# Patient Record
Sex: MALE | Race: WHITE | Employment: OTHER | URBAN - METROPOLITAN AREA
[De-identification: names, ages, dates, MRNs, and addresses within clinical notes are randomized per-mention and may not be internally consistent; named-entity substitution may affect disease eponyms.]

---

## 2017-01-06 ENCOUNTER — OFFICE VISIT (OUTPATIENT)
Dept: SLEEP MEDICINE | Facility: CLINIC | Age: 42
End: 2017-01-06
Payer: COMMERCIAL

## 2017-01-06 ENCOUNTER — OFFICE VISIT (OUTPATIENT)
Dept: SURGERY | Facility: CLINIC | Age: 42
End: 2017-01-06
Payer: COMMERCIAL

## 2017-01-06 VITALS
SYSTOLIC BLOOD PRESSURE: 122 MMHG | DIASTOLIC BLOOD PRESSURE: 81 MMHG | HEART RATE: 113 BPM | BODY MASS INDEX: 38.16 KG/M2 | WEIGHT: 229.3 LBS | RESPIRATION RATE: 16 BRPM

## 2017-01-06 VITALS
OXYGEN SATURATION: 95 % | TEMPERATURE: 98.2 F | BODY MASS INDEX: 36.77 KG/M2 | HEART RATE: 104 BPM | SYSTOLIC BLOOD PRESSURE: 118 MMHG | WEIGHT: 228.8 LBS | HEIGHT: 66 IN | DIASTOLIC BLOOD PRESSURE: 70 MMHG

## 2017-01-06 DIAGNOSIS — Z98.84 BARIATRIC SURGERY STATUS: Primary | ICD-10-CM

## 2017-01-06 DIAGNOSIS — E66.9 OBESITY (BMI 30-39.9): ICD-10-CM

## 2017-01-06 DIAGNOSIS — R12 HEARTBURN: ICD-10-CM

## 2017-01-06 DIAGNOSIS — K91.2 MALNUTRITION FOLLOWING GASTROINTESTINAL SURGERY: ICD-10-CM

## 2017-01-06 DIAGNOSIS — G47.33 OSA (OBSTRUCTIVE SLEEP APNEA): Primary | ICD-10-CM

## 2017-01-06 DIAGNOSIS — E66.01 OBESITY, CLASS III, BMI 40-49.9 (MORBID OBESITY) (H): ICD-10-CM

## 2017-01-06 PROCEDURE — 99213 OFFICE O/P EST LOW 20 MIN: CPT | Performed by: INTERNAL MEDICINE

## 2017-01-06 PROCEDURE — 99213 OFFICE O/P EST LOW 20 MIN: CPT | Performed by: PHYSICIAN ASSISTANT

## 2017-01-06 RX ORDER — DOXYCYCLINE 100 MG/1
TABLET ORAL
Refills: 0 | COMMUNITY
Start: 2016-12-29

## 2017-01-06 NOTE — PROGRESS NOTES
Name: Suhail Noriega MRN# [id number]   Age: 41 year old YOB: 1975     Date of Consultation: January 6, 2017  Primary care provider: Ko Pino           Chief Complaint:    Sleep apnea            History of Present Illness:     Suhail Noriega is a 41 year old male with previous diagnosis of severe obstructive sleep apnea. PSG from 21 March 2016 when patient weighed 342 pounds showed an apnea hypopnea index of 103 per hour. Lowest O2 saturation was 64.7% and there was a total of 103 minutes with saturation below 89%. Patient had adequate titration with a pressure of 19 cm H2O and started on auto PAP therapy with pressure range of 15-20 cm H2O. Patient had a good response to therapy.     He underwent laproscopic gastric sleeve for weight loss on 6/20/2016. He has been successful in losing 120 pounds since then. His weight is currently 228 pounds. He had difficulty tolerating high CPAP pressures after weight loss and gastric sleeve procedure. Although pressure settings were reduced to 10-15 cm H2O, he still has difficulty tolerating CPAP. As a result, he has been poorly compliant with CPAP during the last 3 months.     His snoring had decreased after weight loss. His wife still witnesses intermittent gasping episodes but they are infrequent. He reports feeling better in the daytime and does not have the same level of tiredness or sleepiness.            Medications:     Current Outpatient Prescriptions   Medication Sig     doxycycline Monohydrate 100 MG TABS      ranitidine (ZANTAC) 150 MG tablet Take 1 tablet (150 mg) by mouth 2 times daily As needed     Cyanocobalamin (B-12) 3000 MCG SUBL Place 1 tablet under the tongue 3 times weekly     VITAMIN D, CHOLECALCIFEROL, PO Take 2,000 Units by mouth daily     calcium carbonate-vitamin D 600-400 MG-UNIT CHEW Take 2 chew tab by mouth daily      multivitamin, therapeutic with minerals (THERA-VIT-M) TABS Take 2 tablets by mouth every morning  "Flintstones Complete     order for DME Patient Suhail Noriega was set up at [facility] on March 31, 2016. Patient received a [employer] DreamStation Auto. Pressures were set at Auto 10-15 cm H2O.   Patient s ramp is 7 cm H2O for 30 min and FLEX/EPR is 2.  Patient received a [employer] Mask name: simplus  Full Face mask Size Medium, heated tubing and heated humidifier.  Patient is enrolled in the STM Program and does need to meet compliance. Patient has a follow up on 5/19/16 with Dr. Dahiana Moseley     No current facility-administered medications for this visit.        Allergies   Allergen Reactions     Morphine Itching            Past Medical History:     Does not need 02 supplement at night   Past Medical History   Diagnosis Date     Morbid obesity (H)      Sleep apnea      uses C pap     Psoriasis              Past Surgical History:    No h/o  upper airway surgery  Past Surgical History   Procedure Laterality Date     Laparoscopic gastric sleeve N/A 6/20/2016     Procedure: LAPAROSCOPIC GASTRIC SLEEVE;  Surgeon: John Virgen MD;  Location:  OR            Physical Examination:   /70 mmHg  Pulse 104  Temp(Src) 98.2  F (36.8  C) (Oral)  Ht 1.676 m (5' 6\")  Wt 103.783 kg (228 lb 12.8 oz)  BMI 36.95 kg/m2  SpO2 95%            Assessment and Plan:        Obstructive sleep apnea           Sleep related hypoxemia    - Patient who is s/p bariatric surgery has lost about 30% of his weight since diagnosis of severe obstructive sleep apnea. Sleep apnea symptoms are reduced. He has difficulty tolerating currently prescribed CPAP pressures. I have recommended that he continue CPAP therapy until we reassess sleep disordered breathing with a PSG. Will reduce pressures to improve tolerance. Pressure settings will be changed to 5-15 cm H2O.     - Split night PSG to reassess sleep apnea and CPAP titration as indicated. Patient was advised to abstain from CPAP therapy for 3 days prior to " repeat study for an adequate evaluation.     - Follow up after sleep study      Copy to: Ko Pino MD, MD 1/6/2017     I spent a total of 15 minutes with patient with more than 50% counseling regard

## 2017-01-06 NOTE — MR AVS SNAPSHOT
After Visit Summary   1/6/2017    Suhail Noriega    MRN: [id number]           Patient Information     Date Of Birth          1975        Visit Information        Provider Department      1/6/2017 2:30 PM Rigoberto Thomas MD [facility] [place]        Today's Diagnoses     CHANDLER (obstructive sleep apnea)    -  1       Care Instructions      Your BMI is Body mass index is 36.95 kg/(m^2).  Weight management is a personal decision.  If you are interested in exploring weight loss strategies, the following discussion covers the approaches that may be successful. Body mass index (BMI) is one way to tell whether you are at a healthy weight, overweight, or obese. It measures your weight in relation to your height.  A BMI of 18.5 to 24.9 is in the healthy range. A person with a BMI of 25 to 29.9 is considered overweight, and someone with a BMI of 30 or greater is considered obese. More than two-thirds of American adults are considered overweight or obese.  Being overweight or obese increases the risk for further weight gain. Excess weight may lead to heart disease and diabetes.  Creating and following plans for healthy eating and physical activity may help you improve your health.  Weight control is part of healthy lifestyle and includes exercise, emotional health, and healthy eating habits. Careful eating habits lifelong are the mainstay of weight control. Though there are significant health benefits from weight loss, long-term weight loss with diet alone may be very difficult to achieve- studies show long-term success with dietary management in less than 10% of people. Attaining a healthy weight may be especially difficult to achieve in those with severe obesity. In some cases, medications, devices and surgical management might be considered.  What can you do?  If you are overweight or obese and are interested in methods for weight loss, you should discuss this with your provider.     Consider  reducing daily calorie intake by 500 calories.     Keep a food journal.     Avoiding skipping meals, consider cutting portions instead.    Diet combined with exercise helps maintain muscle while optimizing fat loss. Strength training is particularly important for building and maintaining muscle mass. Exercise helps reduce stress, increase energy, and improves fitness. Increasing exercise without diet control, however, may not burn enough calories to loose weight.       Start walking three days a week 10-20 minutes at a time    Work towards walking thirty minutes five days a week     Eventually, increase the speed of your walking for 1-2 minutes at time    In addition, we recommend that you review healthy lifestyles and methods for weight loss available through the National Institutes of Health patient information sites:  http://win.niddk.nih.gov/publications/index.htm    And look into health and wellness programs that may be available through your health insurance provider, employer, local community center, or anil club.    Weight management plan: Patient was referred to their PCP to discuss a diet and exercise plan.            Follow-ups after your visit        Your next 10 appointments already scheduled     Mar 12, 2017  8:30 PM   PSG Split with BED 4  SLEEP   [facility] ([facility])    [address]  [facility] [place] [address]   [phone]            Mar 31, 2017 12:30 PM   Return Sleep Patient with Rigoberto Thomas MD   [facility] ([facility])    [address]  [place] [address]   [phone]            Jun 30, 2017 10:00 AM   Annual Visit with Beckie Manriquez PA-C   [facility] ([facility])    [address]  [place] [address]   [phone]            Jun 30, 2017 10:30 AM   Annual Visit with Clara Arnold Diet 2, RD    [facility] [place] ([facility])    [facility] [id number] [phone]              Future tests that were ordered for you today     Open Future Orders        Priority Expected Expires Ordered    Comprehensive Sleep Study Routine  7/5/2017 1/6/2017    Vitamin B12 Routine 7/6/2017 9/6/2017 1/6/2017    IRON Routine 7/6/2017 9/6/2017 1/6/2017    IRON AND IRON BINDING CAPACITY Routine 7/6/2017 9/6/2017 1/6/2017    FERRITIN Routine 7/6/2017 9/6/2017 1/6/2017    Vitamin D Screen Routine 7/6/2017 9/6/2017 1/6/2017    Parathyroid Hormone Intact Routine 7/6/2017 9/6/2017 1/6/2017    CBC with platelets Routine 7/6/2017 9/6/2017 1/6/2017            Who to contact     If you have questions or need follow up information about today's clinic visit or your schedule please contact [facility] directly at [phone].  Normal or non-critical lab and imaging results will be communicated to you by TripHobohart, letter or phone within 4 business days after the clinic has received the results. If you do not hear from us within 7 days, please contact the clinic through SweetPerkt or phone. If you have a critical or abnormal lab result, we will notify you by phone as soon as possible.  Submit refill requests through [email or web address] or call your pharmacy and they will forward the refill request to us. Please allow 3 business days for your refill to be completed.          Additional Information About Your Visit        TripHobohariWOPI Information     [email or web address] gives you secure access to your electronic health record. If you see a primary care provider, you can also send messages to your care team and make appointments. If you have questions, please call your primary care clinic.  If you do not have a primary care provider, please call [phone] and they will assist you.        Care EveryWhere ID     This is your Care EveryWhere ID. This could be used by  "other organizations to access your [place] medical records  [id number]        Your Vitals Were     Pulse Temperature Height BMI (Body Mass Index) Pulse Oximetry       104 98.2  F (36.8  C) (Oral) 1.676 m (5' 6\") 36.95 kg/m2 95%        Blood Pressure from Last 3 Encounters:   01/06/17 118/70   01/06/17 122/81   09/23/16 122/75    Weight from Last 3 Encounters:   01/06/17 103.783 kg (228 lb 12.8 oz)   01/06/17 104.01 kg (229 lb 4.8 oz)   09/23/16 120.702 kg (266 lb 1.6 oz)              We Performed the Following     Comprehensive DME          Today's Medication Changes          These changes are accurate as of: 1/6/17  2:55 PM.  If you have any questions, ask your nurse or doctor.               These medicines have changed or have updated prescriptions.        Dose/Directions    ranitidine 150 MG tablet   Commonly known as:  ZANTAC   This may have changed:    - additional instructions  - Another medication with the same name was removed. Continue taking this medication, and follow the directions you see here.   Used for:  Bariatric surgery status, Obesity, Class III, BMI 40-49.9 (morbid obesity) (H), Heartburn   Changed by:  Aydee Fitzgerald PA-C        Dose:  150 mg   Take 1 tablet (150 mg) by mouth 2 times daily As needed   Quantity:  180 tablet   Refills:  1            Where to get your medicines      These medications were sent to [id number] - [place], [place] - [address] AT [facility] [place] [address]  [address], [place] [address]    Hours:  24-hours Phone:  [phone]    - ranitidine 150 MG tablet             Primary Care Provider Office Phone # Fax #    Ko Pino [phone]       [place] [facility] [address]  [place] [address]        Thank you!     Thank you for choosing [facility]  for your care. Our goal is always to provide you with excellent care. Hearing back from our patients is one way we can " continue to improve our services. Please take a few minutes to complete the written survey that you may receive in the mail after your visit with us. Thank you!             Your Updated Medication List - Protect others around you: Learn how to safely use, store and throw away your medicines at www.disposemymeds.org.          This list is accurate as of: 1/6/17  2:55 PM.  Always use your most recent med list.                   Brand Name Dispense Instructions for use    B-12 3000 MCG Subl      Place 1 tablet under the tongue 3 times weekly       calcium carbonate-vitamin D 600-400 MG-UNIT Chew      Take 2 chew tab by mouth daily       doxycycline Monohydrate 100 MG Tabs          multivitamin, therapeutic with minerals Tabs tablet      Take 2 tablets by mouth every morning Flintstones Complete       order for DME      Patient Suhail Noriega was set up at [facility] on March 31, 2016. Patient received a [employer] DreamStation Auto. Pressures were set at Auto 10-15 cm H2O.   Patient?s ramp is 7 cm H2O for 30 min and FLEX/EPR is 2.  Patient received a [employer] Mask name: simplus  Full Face mask Size Medium, heated tubing and heated humidifier.  Patient is enrolled in the STM Program and does need to meet compliance. Patient has a follow up on 5/19/16 with Dr. Thomas.  Estefanía Moseley       ranitidine 150 MG tablet    ZANTAC    180 tablet    Take 1 tablet (150 mg) by mouth 2 times daily As needed       VITAMIN D (CHOLECALCIFEROL) PO      Take 2,000 Units by mouth daily

## 2017-01-06 NOTE — PATIENT INSTRUCTIONS

## 2017-01-06 NOTE — NURSING NOTE
"Chief Complaint   Patient presents with     Sleep Problem       Initial /70 mmHg  Pulse 104  Temp(Src) 98.2  F (36.8  C) (Oral)  Ht 1.676 m (5' 6\")  Wt 103.783 kg (228 lb 12.8 oz)  BMI 36.95 kg/m2  SpO2 95% Estimated body mass index is 36.95 kg/(m^2) as calculated from the following:    Height as of this encounter: 1.676 m (5' 6\").    Weight as of this encounter: 103.783 kg (228 lb 12.8 oz).  BP completed using cuff size: regular left arm  Avis Albright MA  [facility]      "

## 2017-01-06 NOTE — PROGRESS NOTES
Bariatric Follow Up Visit    Date: 2017    RE: MANE WYNN  MR#: [id number]  : 1975    HISTORY OF PRESENT ILLNESS: MANE WYNN returns today for his follow-up appointment status post Laparoscopic Gastric Sleeve surgery. He has lost 111 lbs since starting our program. Would like to get to 180 lbs.  Patient is feeling well. He is doing the following exercise(s): working out at the gym . He exercises every day for 90 minutes per session.  He is unable to see the dietician today but will schedule his annual with them.     Patient is taking the following bariatric postoperative vitamins:  4000 Int Units of Vitamin D daily - increased in December under my direction    He stopped taking all other recommended post op vitamins since his last visit because he wanted to see how his labs responded.  States he eats fish several times a week and drinks Fairlife milk.  Feels he is getting his vitamins from his food sources. Is resistant to restarting recommended vitamins.      OBESITY RELATED CONDITIONS:  CHANDLER: improved, stopped using CPAP 4 months ago.  Has follow up with sleep clinic right after this appointment.  Back Pain: improved    SOCIAL HISTORY:  He does not smoke. He does not drink alcohol. He does not attend support group and feels safe in current environment.  Pt is avoiding NSAIDS.  Does have tendinitis in his shoulder.  Did ask if he could take cinnamon for its natural antiinflammatory properties.   Also, if he can get a Cortizone injection.    REVIEW OF SYSTEMS:    GI:  Nausea-never  Vomiting-never  Diarrhea-never  Constipation-seldom  Dysphagia-never  Abdominal Pains-never  Heartburn-seldom    Skin:  Intertriginous Irritation-never    Psychiatric:  Depression never    PHYSICAL EXAMINATION:   Vital Signs: Weight: 229  lbs; BMI: 37.5; BP: 122 / 75 ; Pulse: 92; Resp: 16;  GENERAL: Alert and oriented x3. NAD  HEART: Regular rate and rhythm, No murmurs, rubs or gallops  LUNGS: Breathing unlabored,  Lung sounds clear to auscultation bilaterally  ABDOMEN: soft; nontender; non distended, incision well healed. No hernia  EXTREMITIES: No LE edema bilaterally, Gait normal  SKIN: No intertriginous irritation or rash    ASSESSMENT:    6 months s/p Laparoscopic Gastric Sleeve  Post surgical malabsorption  GERD without esophagitis      PLAN:    Reviewed Ordered CBC, vitamin B12, vitamin D, PTH, ferritin, TIBC, and iron labs.  Refilled ranitidine.    Can take cinnamon.    Ordered CBC, vitamin B12, vitamin D, PTH, ferritin, TIBC, and iron labs to de drawn at [facility] in 6 months before annual appointment.   Recommend he restart his post operative vitamins.    If he is unwilling to do the calcium and B12 than I would still like him to restart taking his multivitamins.  Flinstones Complete is also a multi mineral. I do not check thiamine, zinc, and copper on a regular basis as bariatric patients should be getting this in their MVI.  If not willing to do this, we discussed doing more vitamin and mineral labs on regular intervals.  Pt will restart Flinstones Complete.  Will review again at annual appt.   Return to clinic in 6 months.

## 2017-01-11 ENCOUNTER — DOCUMENTATION ONLY (OUTPATIENT)
Dept: SLEEP MEDICINE | Facility: CLINIC | Age: 42
End: 2017-01-11

## 2017-04-27 ENCOUNTER — TELEPHONE (OUTPATIENT)
Dept: SURGERY | Facility: CLINIC | Age: 42
End: 2017-04-27

## 2017-04-27 DIAGNOSIS — K91.2 MALNUTRITION FOLLOWING GASTROINTESTINAL SURGERY: ICD-10-CM

## 2017-04-27 DIAGNOSIS — Z98.84 BARIATRIC SURGERY STATUS: ICD-10-CM

## 2017-04-27 LAB
ERYTHROCYTE [DISTWIDTH] IN BLOOD BY AUTOMATED COUNT: 13.2 % (ref 10–15)
HCT VFR BLD AUTO: 45.7 % (ref 40–53)
HGB BLD-MCNC: 15.5 G/DL (ref 13.3–17.7)
MCH RBC QN AUTO: 34 PG (ref 26.5–33)
MCHC RBC AUTO-ENTMCNC: 33.9 G/DL (ref 31.5–36.5)
MCV RBC AUTO: 100 FL (ref 78–100)
PLATELET # BLD AUTO: 220 10E9/L (ref 150–450)
RBC # BLD AUTO: 4.56 10E12/L (ref 4.4–5.9)
WBC # BLD AUTO: 9.2 10E9/L (ref 4–11)

## 2017-04-27 PROCEDURE — 82607 VITAMIN B-12: CPT | Performed by: PHYSICIAN ASSISTANT

## 2017-04-27 PROCEDURE — 83540 ASSAY OF IRON: CPT | Performed by: PHYSICIAN ASSISTANT

## 2017-04-27 PROCEDURE — 82728 ASSAY OF FERRITIN: CPT | Performed by: PHYSICIAN ASSISTANT

## 2017-04-27 PROCEDURE — 83550 IRON BINDING TEST: CPT | Performed by: PHYSICIAN ASSISTANT

## 2017-04-27 PROCEDURE — 85027 COMPLETE CBC AUTOMATED: CPT | Performed by: PHYSICIAN ASSISTANT

## 2017-04-27 PROCEDURE — 36415 COLL VENOUS BLD VENIPUNCTURE: CPT | Performed by: PHYSICIAN ASSISTANT

## 2017-04-27 PROCEDURE — 83970 ASSAY OF PARATHORMONE: CPT | Performed by: PHYSICIAN ASSISTANT

## 2017-04-27 PROCEDURE — 82306 VITAMIN D 25 HYDROXY: CPT | Performed by: PHYSICIAN ASSISTANT

## 2017-04-27 NOTE — TELEPHONE ENCOUNTER
Patient called to request yearly lab orders for his sleeve be entered in the [facility] system.  Explained to patient that this isn't possible.  Offered to fax lab orders for his PCP to do.  Patient will call his clinic to come up with a plan and call back with their fax number if needed.  Patient verbalized understanding and is agreeable to plan.    Charity Evans, MS, RD, RN

## 2017-04-28 LAB
DEPRECATED CALCIDIOL+CALCIFEROL SERPL-MC: 30 UG/L (ref 20–75)
FERRITIN SERPL-MCNC: 303 NG/ML (ref 26–388)
IRON SATN MFR SERPL: 40 % (ref 15–46)
IRON SERPL-MCNC: 127 UG/DL (ref 35–180)
PTH-INTACT SERPL-MCNC: 50 PG/ML (ref 12–72)
TIBC SERPL-MCNC: 319 UG/DL (ref 240–430)
VIT B12 SERPL-MCNC: 984 PG/ML (ref 193–986)

## 2017-06-30 ENCOUNTER — OFFICE VISIT (OUTPATIENT)
Dept: SURGERY | Facility: CLINIC | Age: 42
End: 2017-06-30
Payer: COMMERCIAL

## 2017-06-30 VITALS
BODY MASS INDEX: 33.52 KG/M2 | SYSTOLIC BLOOD PRESSURE: 137 MMHG | DIASTOLIC BLOOD PRESSURE: 74 MMHG | HEART RATE: 85 BPM | WEIGHT: 207.7 LBS

## 2017-06-30 DIAGNOSIS — R12 HEARTBURN: ICD-10-CM

## 2017-06-30 DIAGNOSIS — E66.811 OBESITY (BMI 30.0-34.9): Primary | ICD-10-CM

## 2017-06-30 DIAGNOSIS — Z98.84 BARIATRIC SURGERY STATUS: ICD-10-CM

## 2017-06-30 DIAGNOSIS — K91.2 POSTSURGICAL MALABSORPTION: ICD-10-CM

## 2017-06-30 PROCEDURE — 99214 OFFICE O/P EST MOD 30 MIN: CPT | Performed by: PHYSICIAN ASSISTANT

## 2017-06-30 NOTE — MR AVS SNAPSHOT
MRN:[id number]                      After Visit Summary   6/30/2017    Suhail Noriega    MRN: [id number]           Visit Information        Provider Department      6/30/2017 10:30 AM 2, Sh Clayton De Los Santos, ASHLEIGH [facility]      [facility] Information     ConSentry Networkshart gives you secure access to your electronic health record. If you see a primary care provider, you can also send messages to your care team and make appointments. If you have questions, please call your primary care clinic.  If you do not have a primary care provider, please call [phone] and they will assist you.        Care EveryWhere ID     This is your Care EveryWhere ID. This could be used by other organizations to access your [facility] medical records  [id number]        Equal Access to Services     JANELLE LOUIS : Karena Garber, washeree cooper, harman kaalmanickie oliva, aga erazo. So [facility] [phone].    ATENCIÓN: Si habla español, tiene a kent disposición servicios gratuitos de asistencia lingüística. Llame al [phone].    We comply with applicable federal civil rights laws and [place] laws. We do not discriminate on the basis of race, color, national origin, age, disability sex, sexual orientation or gender identity.

## 2017-06-30 NOTE — MR AVS SNAPSHOT
After Visit Summary   6/30/2017    Suhail Noriega    MRN: [id number]           Patient Information     Date Of Birth          1975        Visit Information        Provider Department      6/30/2017 10:00 AM Beckie Manriquez PA-C [facility]      Today's Diagnoses     Obesity (BMI 30.0-34.9)    -  1    Bariatric surgery status        Postsurgical malabsorption        Heartburn          Care Instructions    PLAN:     Ordered CBC, vitamin B12, vitamin D, PTH, ferritin, TIBC, and iron labs.  Follow food plan per dietitian recommendations.  Start taking recommended post-op vitamins. - Strongly encouraged patient to at least take the recommended MVI with minerals and iron.  Informed him he could be deficient in things such as copper that are not routinely checked.  Patient states he might start taking one of these daily.  heartburn  - continue with daily zantac.  Rx sent over to pharmacy  Return to clinic in 1 year.          Follow-ups after your visit        Follow-up notes from your care team     Return in 1 year (on 6/30/2018).      Future tests that were ordered for you today     Open Future Orders        Priority Expected Expires Ordered    Vitamin D Screen Routine 6/30/2017 6/30/2018 6/30/2017    Parathyroid Hormone Intact Routine 6/30/2017 6/30/2018 6/30/2017    Iron Routine 6/30/2017 6/30/2018 6/30/2017    Iron and Iron Binding Capacity Routine 6/30/2017 6/30/2018 6/30/2017    Ferritin Routine 6/30/2017 6/30/2018 6/30/2017    CBC with platelets Routine 6/30/2017 6/30/2018 6/30/2017    Vitamin B12 Routine 6/30/2017 6/30/2018 6/30/2017            Who to contact     If you have questions or need follow up information about today's clinic visit or your schedule please contact [facility] directly at [phone].  Normal or non-critical lab and imaging results will be communicated to  you by MyChart, letter or phone within 4 business days after the clinic has received the results. If you do not hear from us within 7 days, please contact the clinic through [employer] or phone. If you have a critical or abnormal lab result, we will notify you by phone as soon as possible.  Submit refill requests through [employer] or call your pharmacy and they will forward the refill request to us. Please allow 3 business days for your refill to be completed.          Additional Information About Your Visit        [employer] Information     [employer] gives you secure access to your electronic health record. If you see a primary care provider, you can also send messages to your care team and make appointments. If you have questions, please call your primary care clinic.  If you do not have a primary care provider, please call [phone] and they will assist you.        Care EveryWhere ID     This is your Care EveryWhere ID. This could be used by other organizations to access your [facility] medical records  [id number]        Your Vitals Were     Pulse BMI (Body Mass Index)                85 33.52 kg/m2           Blood Pressure from Last 3 Encounters:   06/30/17 137/74   01/06/17 118/70   01/06/17 122/81    Weight from Last 3 Encounters:   06/30/17 207 lb 11.2 oz (94.2 kg)   01/06/17 228 lb 12.8 oz (103.8 kg)   01/06/17 229 lb 4.8 oz (104 kg)              We Performed the Following     OP ROOMING NOTE TO LAURITA          Today's Medication Changes          These changes are accurate as of: 6/30/17  3:16 PM.  If you have any questions, ask your nurse or doctor.               These medicines have changed or have updated prescriptions.        Dose/Directions    * ranitidine 150 MG tablet   Commonly known as:  ZANTAC   This may have changed:  Another medication with the same name was added. Make sure you understand how and when to take each.   Used for:  Bariatric surgery status, Obesity, Class III, BMI 40-49.9 (morbid obesity)  (H), Heartburn   Changed by:  Aydee Fitzgerald PA-C        Dose:  150 mg   Take 1 tablet (150 mg) by mouth 2 times daily As needed   Quantity:  180 tablet   Refills:  1       * ranitidine 150 MG tablet   Commonly known as:  ZANTAC   This may have changed:  You were already taking a medication with the same name, and this prescription was added. Make sure you understand how and when to take each.   Used for:  Bariatric surgery status, Heartburn   Changed by:  Beckie Manriquez PA-C        Dose:  150 mg   Take 1 tablet (150 mg) by mouth At Bedtime   Quantity:  180 tablet   Refills:  1       * Notice:  This list has 2 medication(s) that are the same as other medications prescribed for you. Read the directions carefully, and ask your doctor or other care provider to review them with you.         Where to get your medicines      These medications were sent to [id number] - [place], [place] - [address]  [address], [facility] [address]     Phone:  [phone]     ranitidine 150 MG tablet                Primary Care Provider Office Phone # Fax #    Ko Pino [phone]       [facility] [address] [facility] [address]        Equal Access to Services     JANELLE LOUIS AH: Hadii tommy [place] hadasho Soomaali, waaxda luqadaha, qaybta kaalmada adeegyada, waxay jose rafael hayarias erazo. So [facility] [phone].    ATENCIÓN: Si habla español, tiene a kent disposición servicios gratrachos de asistencia lingüística. Llame al [phone].    We comply with applicable federal civil rights laws and [place] laws. We do not discriminate on the basis of race, color, national origin, age, disability sex, sexual orientation or gender identity.            Thank you!     Thank you for choosing [facility]  for your care. Our goal is always to provide you with excellent care. Hearing back from our patients is one way we  can continue to improve our services. Please take a few minutes to complete the written survey that you may receive in the mail after your visit with us. Thank you!             Your Updated Medication List - Protect others around you: Learn how to safely use, store and throw away your medicines at www.disposemymeds.org.          This list is accurate as of: 6/30/17  3:16 PM.  Always use your most recent med list.                   Brand Name Dispense Instructions for use Diagnosis    B-12 3000 MCG Subl      Place 1 tablet under the tongue 3 times weekly        calcium carbonate-vitamin D 600-400 MG-UNIT Chew      Take 2 chew tab by mouth daily    Morbid obesity with BMI of 50.0-59.9, adult (H)       doxycycline Monohydrate 100 MG Tabs           multivitamin, therapeutic with minerals Tabs tablet      Take 2 tablets by mouth every morning Flintstones Complete    Morbid obesity with BMI of 50.0-59.9, adult (H)       order for DME      Patient Suhail Noriega was set up at [facility] on March 31, 2016. Patient received a [employer] DreamStation Auto. Pressures were set at Auto 5-15 cm H2O.   Patient?s ramp is 7 cm H2O for 30 min and FLEX/EPR is 2.  Patient received a [employer] Mask name: simplus  Full Face mask Size Medium, heated tubing and heated humidifier.  Patient is enrolled in the [facility] Program and does need to meet compliance. Patient has a follow up on 5/19/16 with Dr. Dahiana Moseley        * ranitidine 150 MG tablet    ZANTAC    180 tablet    Take 1 tablet (150 mg) by mouth 2 times daily As needed    Bariatric surgery status, Obesity, Class III, BMI 40-49.9 (morbid obesity) (H), Heartburn       * ranitidine 150 MG tablet    ZANTAC    180 tablet    Take 1 tablet (150 mg) by mouth At Bedtime    Bariatric surgery status, Heartburn       VITAMIN D (CHOLECALCIFEROL) PO      Take 2,000 Units by mouth daily    Morbid obesity with BMI of 50.0-59.9, adult (H)       * Notice:  This list has 2  medication(s) that are the same as other medications prescribed for you. Read the directions carefully, and ask your doctor or other care provider to review them with you.

## 2017-06-30 NOTE — PROGRESS NOTES
DATE OF VISIT: 2017  Name: MANE WYNN  : 1975  Gender: Male  MRN: [id number]  Age: 41    ASSESSMENT:  REASON FOR VISIT:  MANE WYNN is a 41 year old Male presents today for 1 year PO nutrition follow-up appointment.  DIAGNOSIS:  Status post Laparoscopic Gastric Sleeve surgery.  Obesity Class I  ANTHROPOMETRICS:  Height: 65.5 inches  Weight: 207 lbs  BMI: 33.9 kg/m2  VITAMINS AND MINERALS:  2000 IU vitamin D   NUTRITION HISTORY:  Breakfast: 1-2 protein shake (Muscle Milk light) or 42 gram protein shake   Lunch: sushi (mackerel) 8-10 pc 6-8 oz.  Supper: chicken or bratwurst (2) or pork chops (6 oz.) 4 oz fish or chicken or steak Patient eats fruit 1 time per week and vegetable 1 time per day   Snacks: Protein drink or 16 oz skim Fairlife milk (often 2 per day)  Beverages:water, protein drink, Fairlife milk  Consuming liquid calories: Protein supplements/shakes, Milk  Protein intake:  grams/day  Tolerate regular texture food: Yes  Any foods not tolerated details: tomatoes or acidic foods   Portion size: 1 cup or more  Take 30 minutes to consume each meal: Yes  Eat protein foods first: Yes  Fluids and meals separate by at least 30 minutes: Yes  Chew foods 20 plus times: Yes  Tolerating diet: bariatric regular diet  Drinking high protein supplements/shakes: Yes  Consuming meals per day: 3  Consuming snacks per day: 1  Comment: Pt attributes weight loss this month to tracking his intake on a spread sheet and increased exercise. This past month pt has been looking at bariatric blogs and has decided that he would like to have a sleeve gastrostomy. Pt and his wife work from home and enjoy dinning out often; patient's brother had weight loss surgery and has been successful; father of 3 teenagers; works in the [employer] industry with people form other contries (works evening hours) and generally wakes up at 11:00 am-12:00 pm; pt had many appropriate questions rding weight loss surgery. Pt is pleased with  his weight loss and denies any problems with eating  6/30/17 Patient very pleased with weight loss and feels he is consuming adequate diet.    PHYSICAL ACTIVITY:  Type: working out at the gym. Lifting and cardio  Frequency: Every day  Duration (min): 90  DIAGNOSIS:  Previous Nutrition Diagnosis: Altered gastrointestinal function related to alteration in gastrointestinal structure as evidenced by history of Laparoscopic Gastric Sleeve surgery.-no change  Previous goals:  Continue to practice all post-surgical diet guidelines-met  Take calcium 2 hours away from multivitamin/ minerals-not met  Have 3 different food groups per meal-not met  Limit protein drinks to one per day-not met   Current Nutrition Diagnosis: Altered gastrointestinal function related to alteration in gastrointestinal structure as evidenced by history of Laparoscopic Gastric Sleeve  INTERVENTION:  Nutrition Prescription: Eat 3 meals a day at regular intervals. Consume 60-90 grams of protein daily. Follow post-surgical vitamins and minerals protocol.  Goals:  Resume taking complete multivitamin and mineral   Implementation: Discussed progress toward previous goals; reinforced importance of following bariatric lifestyle changes. Patient was not receptive to adding additional foods into his diet as patient prefers Atkins type diet and feels he can meet his nutritional needs. Explained that vitamin and mineral deficiencies develop slowly over time. Stressed importance of copper in his diet.   NUTRITION MONITORING AND EVALUATION:  Anticipated compliance: Fair to good  Verbalized understanding by stating will take 1 multivitamin    Follow up:  Patient to follow up in 1 year for 2 year post op appointment   TIME SPENT WITH PATIENT:  15 minutes  Michael Woods, RD, LD  [facility] Outpatient Dietitian  [phone] (office phone)

## 2017-06-30 NOTE — PATIENT INSTRUCTIONS
PLAN:     Ordered CBC, vitamin B12, vitamin D, PTH, ferritin, TIBC, and iron labs.  Follow food plan per dietitian recommendations.  Start taking recommended post-op vitamins. - Strongly encouraged patient to at least take the recommended MVI with minerals and iron.  Informed him he could be deficient in things such as copper that are not routinely checked.  Patient states he might start taking one of these daily.  heartburn  - continue with daily zantac.  Rx sent over to pharmacy  Return to clinic in 1 year.

## 2017-06-30 NOTE — PROGRESS NOTES
Bariatric Follow Up Visit       Date: 2017      RE: MANE WYNN  MR#: [id number]  : 1975      HISTORY OF PRESENT ILLNESS: MANE WYNN returns today for his follow-up appointment status post Laparoscopic Gastric Sleeve surgery. He has lost 133.0 lbs since starting our program. Patient is feeling well. He is doing the following exercise(s): working out at the gym. Lifting and cardio. He exercises everyday for 90 minutes per session. Overall doing very well and feeling good.  Only takes 2000 IU's of Vitamin D daily.  Feels that he gets what he needs from his diet so does not take the other recommended vitamins.  Drinks 128+ oz of fluid daily.        OBESITY RELATED CONDITIONS:  CHANDLER: resolved - Not on CPAP  Back Pain: improved       SOCIAL HISTORY:  He does not smoke. He drinks alcohol and consumed a couple glasses of wine over the past 6 months. NO NSAID use. He does not attend support group and feels safe in current environment.      REVIEW OF SYSTEMS:  GI:  Nausea-never  Vomiting-never  Diarrhea-never  Constipation-seldom  Dysphagia-never  Abdominal Pains-never  Heartburn-seldom - takes zantac daily at bedtime.  Well controlled with this and avoiding acidic foods.      Skin:  Intertriginous Irritation-never    Psychiatric:  Depression never      PHYSICAL EXAMINATION:   Vital Signs: Weight: 207.7  lbs; BMI: 33.52; BP:137 / 74; Pulse: 85; Resp: 16;    GENERAL: Alert and oriented x3. NAD  HEART: Regular rate and rhythm, No murmurs, rubs or gallops  LUNGS: Breathing unlabored, Lung sounds clear to auscultation bilaterally  ABDOMEN: soft; nontender; nondistended, incision well healed. No hernia  EXTREMITIES: No LE edema bilaterally, Gait normal  SKIN: No intertriginous irritation or rash      ASSESSMENT:   12 months s/p Laparoscopic Gastric Sleeve  Post surgical malabsorption  heartburn  Morbid obesity resolved  CHANDLER - resolved      PLAN:   Ordered CBC, vitamin B12, vitamin D, PTH, ferritin, TIBC, and  iron labs.  Follow food plan per dietitian recommendations.  Start taking recommended post-op vitamins. - Strongly encouraged patient to at least take the recommended MVI with minerals and iron.  Informed him he could be deficient in things such as copper that are not routinely checked.  Patient states he might start taking one of these daily.  heartburn  - continue with daily zantac.  Rx sent over to pharmacy  Return to clinic in 1 year.    This was a 20 minute visit with greater than 50% spent on counseling.

## 2017-11-27 DIAGNOSIS — G47.33 OSA (OBSTRUCTIVE SLEEP APNEA): Primary | ICD-10-CM

## 2017-12-04 ENCOUNTER — TELEPHONE (OUTPATIENT)
Dept: SURGERY | Facility: CLINIC | Age: 42
End: 2017-12-04

## 2017-12-04 DIAGNOSIS — Z98.84 BARIATRIC SURGERY STATUS: ICD-10-CM

## 2017-12-04 DIAGNOSIS — R12 HEARTBURN: ICD-10-CM

## 2017-12-04 NOTE — TELEPHONE ENCOUNTER
Pt called her needs refill of ranitidine.  He also was googling ranitidine and his other medications online looking at possible long term side effects of ranitidine and wants to make sure she should still be takign it or if there are alternatives. Pt said he had pneumonia in the last year and never had it before and takes sildenafil and saw online that ranitidine can causes low sexual desire.      I reviewed Up to Date Drug information and adverse SE.  ED and low sexual desire are not common side effects.  pneumonia was seen in a study in pediatric patients in 2006 but it was not a causal relationship.  I still believe it is safe to take.  If yo are not having symptoms on a regular basis you can stop taking ranitidine and use it as needed.  Alternatively, you can use Tums, or Mylanta when heartburn occurs.  You can avoid spicy, tomato based products.  Don't eat within 3 hours of bed.  All these things should help.  If you are getting symptoms consistently more than twice weekly despite these changes, then you should be taking your ranitidine twice daily.      Pt is good with this.  Would like refill today and will try other options for the next week to see if he needs to be on it still.

## 2017-12-05 DIAGNOSIS — E66.01 OBESITY, CLASS III, BMI 40-49.9 (MORBID OBESITY) (H): ICD-10-CM

## 2017-12-05 DIAGNOSIS — R12 HEARTBURN: ICD-10-CM

## 2017-12-05 DIAGNOSIS — Z98.84 BARIATRIC SURGERY STATUS: ICD-10-CM

## 2018-03-12 ENCOUNTER — TELEPHONE (OUTPATIENT)
Dept: SURGERY | Facility: CLINIC | Age: 43
End: 2018-03-12

## 2018-03-12 NOTE — TELEPHONE ENCOUNTER
Pt left message on VM.  Got annual letter in mail. HE is 2 years post op and saw her needs to have labs and dexa scan done.  Wants to talk to someone about having these ordered.    Shantelle, please call the pt back and let him know we will discuss these are order them at his 2 year follow up visit in June.  If he wants to get the labs drawn early he can schedule his appt and I can order them to be drawn a week before his appointment.

## 2018-03-13 NOTE — TELEPHONE ENCOUNTER
Extended vitamin labs for upcoming appt.  Will not order LFTs there is no medical need for this.  Send pt mychart regarding this.

## 2018-04-03 DIAGNOSIS — K91.2 POSTSURGICAL MALABSORPTION: ICD-10-CM

## 2018-04-03 DIAGNOSIS — Z98.84 BARIATRIC SURGERY STATUS: ICD-10-CM

## 2018-04-03 LAB
ERYTHROCYTE [DISTWIDTH] IN BLOOD BY AUTOMATED COUNT: 13 % (ref 10–15)
HCT VFR BLD AUTO: 44.3 % (ref 40–53)
HGB BLD-MCNC: 14.6 G/DL (ref 13.3–17.7)
MCH RBC QN AUTO: 33 PG (ref 26.5–33)
MCHC RBC AUTO-ENTMCNC: 33 G/DL (ref 31.5–36.5)
MCV RBC AUTO: 100 FL (ref 78–100)
PLATELET # BLD AUTO: 222 10E9/L (ref 150–450)
PTH-INTACT SERPL-MCNC: 37 PG/ML (ref 18–80)
RBC # BLD AUTO: 4.42 10E12/L (ref 4.4–5.9)
VIT B12 SERPL-MCNC: 842 PG/ML (ref 193–986)
WBC # BLD AUTO: 4.7 10E9/L (ref 4–11)

## 2018-04-03 PROCEDURE — 82607 VITAMIN B-12: CPT | Performed by: PHYSICIAN ASSISTANT

## 2018-04-03 PROCEDURE — 82306 VITAMIN D 25 HYDROXY: CPT | Performed by: PHYSICIAN ASSISTANT

## 2018-04-03 PROCEDURE — 82728 ASSAY OF FERRITIN: CPT | Performed by: PHYSICIAN ASSISTANT

## 2018-04-03 PROCEDURE — 83550 IRON BINDING TEST: CPT | Performed by: PHYSICIAN ASSISTANT

## 2018-04-03 PROCEDURE — 83540 ASSAY OF IRON: CPT | Performed by: PHYSICIAN ASSISTANT

## 2018-04-03 PROCEDURE — 36415 COLL VENOUS BLD VENIPUNCTURE: CPT | Performed by: PHYSICIAN ASSISTANT

## 2018-04-03 PROCEDURE — 85027 COMPLETE CBC AUTOMATED: CPT | Performed by: PHYSICIAN ASSISTANT

## 2018-04-03 PROCEDURE — 83970 ASSAY OF PARATHORMONE: CPT | Performed by: PHYSICIAN ASSISTANT

## 2018-04-04 LAB
DEPRECATED CALCIDIOL+CALCIFEROL SERPL-MC: 34 UG/L (ref 20–75)
FERRITIN SERPL-MCNC: 341 NG/ML (ref 26–388)
IRON SATN MFR SERPL: 55 % (ref 15–46)
IRON SERPL-MCNC: 173 UG/DL (ref 35–180)
TIBC SERPL-MCNC: 315 UG/DL (ref 240–430)

## 2018-04-12 ENCOUNTER — OFFICE VISIT (OUTPATIENT)
Dept: SURGERY | Facility: CLINIC | Age: 43
End: 2018-04-12
Payer: COMMERCIAL

## 2018-04-12 VITALS
WEIGHT: 207.8 LBS | BODY MASS INDEX: 33.54 KG/M2 | DIASTOLIC BLOOD PRESSURE: 74 MMHG | RESPIRATION RATE: 15 BRPM | SYSTOLIC BLOOD PRESSURE: 119 MMHG | HEART RATE: 104 BPM

## 2018-04-12 VITALS — WEIGHT: 207.8 LBS | BODY MASS INDEX: 33.4 KG/M2 | HEIGHT: 66 IN

## 2018-04-12 DIAGNOSIS — R12 HEARTBURN: ICD-10-CM

## 2018-04-12 DIAGNOSIS — K91.2 MALNUTRITION FOLLOWING GASTROINTESTINAL SURGERY: Primary | ICD-10-CM

## 2018-04-12 DIAGNOSIS — E66.811 OBESITY (BMI 30.0-34.9): ICD-10-CM

## 2018-04-12 DIAGNOSIS — R79.0 ABNORMAL IRON SATURATION: ICD-10-CM

## 2018-04-12 DIAGNOSIS — Z98.84 BARIATRIC SURGERY STATUS: ICD-10-CM

## 2018-04-12 PROCEDURE — 97803 MED NUTRITION INDIV SUBSEQ: CPT | Performed by: DIETITIAN, REGISTERED

## 2018-04-12 PROCEDURE — 99213 OFFICE O/P EST LOW 20 MIN: CPT | Performed by: PHYSICIAN ASSISTANT

## 2018-04-12 NOTE — MR AVS SNAPSHOT
"                  MRN:[id number]                      After Visit Summary   2018    Suhail Noriega    MRN: [id number]           Visit Information        Provider Department      2018 11:00 AM 3, Sh Clayton De Los Santos, ASHLEIGH [facility] [facility]      MyChart Information     [facility] lets you send messages to your doctor, view your test results, renew your prescriptions, schedule appointments and more. To sign up, go to [email or web address] . Click on \"Log in\" on the left side of the screen, which will take you to the Welcome page. Then click on \"Sign up Now\" on the right side of the page.     You will be asked to enter the access code listed below, as well as some personal information. Please follow the directions to create your username and password.     Your access code is: [email or web address]  Expires: 2018  2:52 PM     Your access code will  in 90 days. If you need help or a new code, please call your [facility] clinic or [phone].        Care EveryWhere ID     This is your Care EveryWhere ID. This could be used by other organizations to access your [facility] medical records  [id number]        Equal Access to Services     JANELLE LOUIS : Karena dugano Soaudreyali, waaxda luqadaha, qaybta kaalmada adeegyada, aga erazo. So [facility] [phone].    ATENCIÓN: Si habla español, tiene a kent disposición servicios gratuitos de asistencia lingüística. Llame al [phone].    We comply with applicable federal civil rights laws and [place] laws. We do not discriminate on the basis of race, color, national origin, age, disability, sex, sexual orientation, or gender identity.            "

## 2018-04-12 NOTE — PROGRESS NOTES
BARIATRIC FOLLOW UP VISIT     April 12, 2018       HISTORY OF PRESENT ILLNESS: Pt returns today for his follow-up appointment status post laparoscopic gastric sleeve.     Initial Weight: 340 lb (154.2 kg)   Current Weight: 207 lb 12.8 oz (94.3 kg)  Cumulative weight loss (lbs): 132.2  Last Visits Weight: 207 lb 11.2 oz (94.2 kg)     Patient is taking the following bariatric postoperative vitamins:  2 Complete multivitamins with minerals about 3 times a week  3000 International Units of Vitamin D daily  No Calcium   No B12    Pt is exercising 5-6 days a week. He is actively trying to build muscle. Supplements with excess protein. Is lifting 3x a week.  Does treadmill or St[employer] other 3 days a week.         OBESITY RELATED CONDITIONS:  CHANDLER: resolved - Not on CPAP  Back Pain: improved     SOCIAL HISTORY:  He does not smoke. He drinks alcohol and consumed a couple glasses of wine over the past 6 months. NO NSAID use. He does not attend support group and feels safe in current environment.    REVIEW OF SYSTEMS:     GI:  Nausea-never  Vomiting-never  Diarrhea-never  Constipation-seldom  Dysphagia-never  Abdominal Pains-never  Heartburn-seldom - takes zantac daily at bedtime.  Well controlled with this and avoiding acidic foods.      LABS/IMAGING/MEDICAL RECORDS REVIEW:   4/3/18 CBC, vitamin B12, vitamin D, PTH, ferritin, TIBC, and iron labs.  4/27/17 Ferritin, Iron studies  12/9/16 Ferritin, Iron studies  10/5/16 Ferritin, Iron studies    PHYSICAL EXAMINATION:   /74  Pulse 104  Resp 15  Wt 207 lb 12.8 oz (94.3 kg)  BMI 33.54 kg/m2    GENERAL Pt in NAD.  HEART: No JVD  LUNGS: Breathing unlabored.  MUSC: Gait normal  NEURO: Alert and oriented x3.     ASSESSMENT AND PLAN:    2 years status laparoscopic gastric sleeve  Obesity  Body mass index is 33.54 kg/(m^2)..  Post surgical malabsorption  Vitamin D insufficiency  Elevated Iron saturation  Heartburn    Reviewed CBC, vitamin B12, vitamin D, PTH, ferritin, TIBC,  and iron labs.  Also reviewed Iron labs over time.  Pt will continue to see PCP to monitor iron labs. I am not concerned by his iron saturation but will continue to monitor annually.  Pt is doing a lot of protein and body building supplementation and I wonder if this may be contributing to this.   Follow food plan per dietitian recommendations.  Restart taking calcium supplement. 500 mg TID or 600 mg BID   Increase vitamin D to 4000 Int Units in Summer and 5000 Int Units in winter.  Does not need additional Vitamin B12 supplement but does need to continue to check B12 level annually.   Ordered Bone density Scan.  Explained importance of taking postoperative vitamins daily to keep healthy.  Also explained how calcium supplementation is important in bone health and that calcium lab levels will not reflect current needs.  Explained other measures we use:  PTH, Vit D, Bone density Scan  Refilled Ranitidine 150 mg Take 1 qhs for heartburn Can go up to BID prn.  Return to clinic in 1 year.      I spent a total of 20 minutes face to face with Suhail during today's office visit. Over 50% of this time was spent counseling the patient and/or coordinating care.

## 2018-04-12 NOTE — PROGRESS NOTES
"NUTRITION POST OP APPOINTMENT  DATE OF VISIT: April 12, 2018    Suhail Noriega  1975  male  [id number]  42 year old     ASSESSMENT:    REASON FOR VISIT:  Suhail is a 42 year old year old male presents today for 2 year PO nutrition follow-up appointment. Patient is accompanied by self.    DIAGNOSIS:  Status post gastric sleeve surgery.  Obesity Obesity Grade I BMI 30-34.9     ANTHROPOMETRICS:  Initial Weight: 340 lbs  Height: 5' 5.5\"   Current Weight:207 lbs 12.8 oz     Body mass index is 34.05 kg/(m^2).    VITAMINS AND MINERALS:  2 Multivitamin with Minerals  3000 International units Vitamin D (3x/week)  Not taking calcium or Vitamin B12  No iron needed per clinic guidelines      NUTRITION HISTORY:  Breakfast: protein shake (within 1 hour of fasted workout upon waking)  Lunch: sushi, ramen bowls, pizza   Supper: ribs (4) + cabbage salad OR steak or fish OR chick ant-A grilled chicken nuggets  Snacks: protein shakes   Fluids consumed: Water, protein drinks, occasional coffee, powerade zero, occasional pop   Consuming liquid calories: Yes  Protein intake:  grams/day  Tolerate regular texture food: Yes  Any foods not tolerated details: Yes  If any food not tolerated: spicy/acidic foods  Portion size: 1 cup or more  Take 20-30 minutes to consume each meal: Yes   Eat protein foods first: Yes  Fluids and meals separate by at least 30 minutes: Yes  Chew foods thoroughly: Yes  Tolerating diet: Yes  Drinking high protein supplements: Yes  Consuming snacks per day: several  Additional Information: Pt does not believe he needs to take post-op supplements d/t adequate bloodwork. Discussed limitations of lab testing and long-term effects of not taking calcium. Generally exceeding portions, typically eats 1500 kcals/day. Several protein drinks/fair life milk each day. Discussed risks of oversupplementing protein. Recommended calorie goal of 2486-2122 if starts to experience weight gain.       PHYSICAL " ACTIVITY:  Type: weight lifting + treadmill + stairmaster (interval training)   Frequency (days per week): 5  Duration (min): 60    DIAGNOSIS:  Previous Nutrition Diagnosis: Altered gastrointestinal function related to alteration in gastrointestinal structure as evidenced by history of gastric sleeve surgery.- no change    Previous goals:  Resume taking complete multivitamin and mineral - met    Current Nutrition Diagnosis: Altered gastrointestinal function related to alteration in gastrointestinal structure as evidenced by history of gastric sleeve surgery.    INTERVENTION:   Nutrition Prescription: Eat 3 meals a day at regular intervals. Consume 60-90 grams of protein daily. Follow post-surgical vitamin and mineral protocol.  Assessed learning needs and learning preferences.    GOALS:  Take Calcium per guidelines (reviewed)    Follow-Up:   Recommend standard follow up visits to assist with lifestyle changes or per insurance.  Implementation: Discussed progress toward previous goals; reinforced importance of following bariatric lifestyle changes.    NUTRITION MONITORING AND EVALUATION:  Anticipated compliance: fair  Verbalized fair-good understanding.    Follow up: Patient to follow up in 12 months.    TIME SPENT WITH PATIENT:  20 minutes    Mariana Butler RD, LD  Clinical Dietitian

## 2018-04-12 NOTE — MR AVS SNAPSHOT
"              After Visit Summary   4/12/2018    Suhail Noriega    MRN: [id number]           Patient Information     Date Of Birth          1975        Visit Information        Provider Department      4/12/2018 11:30 AM Aydee Fitzgerald PA-C [facility]      Today's Diagnoses     Malnutrition following gastrointestinal surgery    -  1    Bariatric surgery status        Heartburn        Abnormal iron saturation           Follow-ups after your visit        Future tests that were ordered for you today     Open Future Orders        Priority Expected Expires Ordered    DX Hip/Pelvis/Spine Routine  4/12/2019 4/12/2018            Who to contact     If you have questions or need follow up information about today's clinic visit or your schedule please contact [facility] directly at [phone].  Normal or non-critical lab and imaging results will be communicated to you by [employer], letter or phone within 4 business days after the clinic has received the results. If you do not hear from us within 7 days, please contact the clinic through [employer] or phone. If you have a critical or abnormal lab result, we will notify you by phone as soon as possible.  Submit refill requests through [employer] or call your pharmacy and they will forward the refill request to us. Please allow 3 business days for your refill to be completed.          Additional Information About Your Visit        BioPharm[employer]hart Information     [employer] lets you send messages to your doctor, view your test results, renew your prescriptions, schedule appointments and more. To sign up, go to [email or web address] . Click on \"Log in\" on the left side of the screen, which will take you to the Welcome page. Then click on \"Sign up Now\" on the right side of the page.     You will be asked to enter the access code listed below, as well as some personal " information. Please follow the directions to create your username and password.     Your access code is: [email or web address]  Expires: 2018  2:52 PM     Your access code will  in 90 days. If you need help or a new code, please call your [facility] clinic or [phone].        Care EveryWhere ID     This is your Care EveryWhere ID. This could be used by other organizations to access your [facility] medical records  [id number]        Your Vitals Were     Pulse Respirations BMI (Body Mass Index)             104 15 33.54 kg/m2          Blood Pressure from Last 3 Encounters:   18 119/74   17 137/74   17 118/70    Weight from Last 3 Encounters:   18 207 lb 12.8 oz (94.3 kg)   18 207 lb 12.8 oz (94.3 kg)   17 207 lb 11.2 oz (94.2 kg)                 Today's Medication Changes          These changes are accurate as of 18  6:05 PM.  If you have any questions, ask your nurse or doctor.               These medicines have changed or have updated prescriptions.        Dose/Directions    ranitidine 150 MG tablet   Commonly known as:  ZANTAC   This may have changed:    - additional instructions  - Another medication with the same name was removed. Continue taking this medication, and follow the directions you see here.   Used for:  Bariatric surgery status, Heartburn   Changed by:  Aydee Fitzgerald PA-C        Dose:  150 mg   Take 1 tablet (150 mg) by mouth At Bedtime And BID prn   Quantity:  180 tablet   Refills:  3            Where to get your medicines      These medications were sent to [id number] - [place] - [address]  [address], [place] [address]     Phone:  [phone]     ranitidine 150 MG tablet                Primary Care Provider Office Phone # Fax #    Ko Pino [phone]       [place] [facility] [address] [facility] [address]        Equal Access to Services     JANELLE LOUIS AH:  Hadii aad ku hadnilesho Soomaali, waaxda luqadaha, qaybta kaalmada hazel, aga nahomiin hayaan patriciasoraida biswas larobertonick casrto. So [place] -[phone].    ATENCIÓN: Si rojelio paz, tiene a kent disposición servicios gratuitos de asistencia lingüística. Llame al [phone].    We comply with applicable federal civil rights laws and [place] laws. We do not discriminate on the basis of race, color, national origin, age, disability, sex, sexual orientation, or gender identity.            Thank you!     Thank you for choosing [facility]  for your care. Our goal is always to provide you with excellent care. Hearing back from our patients is one way we can continue to improve our services. Please take a few minutes to complete the written survey that you may receive in the mail after your visit with us. Thank you!             Your Updated Medication List - Protect others around you: Learn how to safely use, store and throw away your medicines at www.disposemymeds.org.          This list is accurate as of 4/12/18  6:05 PM.  Always use your most recent med list.                   Brand Name Dispense Instructions for use Diagnosis    B-12 3000 MCG Subl      Place 1 tablet under the tongue 3 times weekly        calcium carbonate-vitamin D 600-400 MG-UNIT Chew      Take 2 chew tab by mouth daily    Morbid obesity with BMI of 50.0-59.9, adult (H)       doxycycline Monohydrate 100 MG Tabs           multivitamin, therapeutic with minerals Tabs tablet      Take 2 tablets by mouth every morning Flintstones Complete    Morbid obesity with BMI of 50.0-59.9, adult (H)       order for DME      Patient Suhail Noriega was set up at [facility] on March 31, 2016. Patient received a [employer] DreamStation Auto. Pressures were set at Auto 5-15 cm H2O.   Patient?s ramp is 7 cm H2O for 30 min and FLEX/EPR is 2.  Patient received a [employer] Mask name: simplus  Full Face mask Size Medium, heated tubing  and heated humidifier.  Patient is enrolled in the STM Program and does need to meet compliance. Patient has a follow up on 5/19/16 with Dr. Thomas.  Estefanía Moseley        ranitidine 150 MG tablet    ZANTAC    180 tablet    Take 1 tablet (150 mg) by mouth At Bedtime And BID prn    Bariatric surgery status, Heartburn       VITAMIN D (CHOLECALCIFEROL) PO      Take 2,000 Units by mouth daily    Morbid obesity with BMI of 50.0-59.9, adult (H)

## 2018-04-19 ENCOUNTER — TELEPHONE (OUTPATIENT)
Dept: SURGERY | Facility: CLINIC | Age: 43
End: 2018-04-19

## 2018-04-19 NOTE — TELEPHONE ENCOUNTER
Patient called today stating he was seen in clinic 4/12/18 for 2 year PO gastric sleeve visit.    He was given referral for dexa scan.  He was also given numbers to call for the dexa scan.  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    LM on his VM re: phone numbers to call for dexa scan near his house.  Charity Evans MS, RD, RN

## 2018-06-04 ENCOUNTER — TELEPHONE (OUTPATIENT)
Dept: SURGERY | Facility: CLINIC | Age: 43
End: 2018-06-04

## 2018-06-04 NOTE — TELEPHONE ENCOUNTER
Patient had gastric sleeve 6/20/16.  He calls today because he is wondering if he is absorbing cals and protein normally due to his altered anatomy.    Informed him that he is absorbing cals/protein normally; however due to his previous problems with weight he may not have a metabolism that is similar to those who are of a healthy weight.  That is why it is recommended that patients take in 800-1000 cals daily.  He thinks that is too little based on his extensive workout routine and running.    Informed patient that when he saw the RD in April, she recommended 1000-62114 cals daily.  He felt he was doing that and it was sensible.    Informed him also that he should continue his vitamin mineral supplements to insure adequate intake.   He stated he agreed.    He is wondering where the results of his Dexa scan are.  Informed him that I could see there was an order but no results.  Patient then stated he had it done outside of  and the he would get result to us to read.    Charity Evans, MS, RD, RN

## 2019-01-31 ENCOUNTER — TELEPHONE (OUTPATIENT)
Dept: SURGERY | Facility: CLINIC | Age: 44
End: 2019-01-31

## 2019-01-31 NOTE — TELEPHONE ENCOUNTER
Reason for call:  Other, patient is inquiring about if they do a test that actually checks the BMI and takes into consideration a patient muscle mass.  Patient called regarding (reason for call): patient wanting to know if there is a test that can be ordered to check his BMI and take into consideration his muscle mass.  Additional comments: none    Phone number to reach patient:  Home number on file [phone] (home)    Best Time:  anytime    Can we leave a detailed message on this number?  YES

## 2019-02-06 NOTE — TELEPHONE ENCOUNTER
Spoke with patient after discussing with ASHLEIGH Frederick.  Recommended patient contact [facility] and check if ROSA POD is what he is looking for.  Gave patient phone number for fitness center and price quotes from online.  Patient verbalized understanding and is agreeable to plan.  Charity Evans, MS, RD, RN

## 2019-07-26 ENCOUNTER — TELEPHONE (OUTPATIENT)
Dept: SURGERY | Facility: CLINIC | Age: 44
End: 2019-07-26

## 2019-07-26 NOTE — TELEPHONE ENCOUNTER
Reason for call:  Medication   If this is a refill request, has the caller requested the refill from the pharmacy already? Yes  Will the patient be using a [facility]? No  Name of the pharmacy and phone number for the current request: [facility]   [phone]  Name of the medication requested: ZANTAC    Other request: PT WOULD LIKE A CALL BACK FROM PATIENCE SCHWARZ TO EXPLAIN WHY HE CANNOT GET A REFILL WITHOUT AN APPT    Phone number to reach patient:  Cell number on file:    Telephone Information:   Mobile [phone]       Best Time:  ANYTIME    Can we leave a detailed message on this number?  YES

## 2019-07-29 NOTE — TELEPHONE ENCOUNTER
Called pt and left the following message.      I received you message asking I contact you about our need for an appointment before I will refill your medication.   Medical monitoring is standard of care when a patient is on long term medication.  As a provider, if I am providing you medication, I need to be monitoring you annually.   In addition,  Obesity is a chronic condition and good long term management requires consistent follow up care.     Alternatively, you can have your PCP take over prescribing and monitoring this medication. Provided our number to make an appt if desired.

## 2019-08-07 ENCOUNTER — HOSPITAL ENCOUNTER (OUTPATIENT)
Dept: LAB | Facility: CLINIC | Age: 44
Discharge: HOME | End: 2019-08-07
Admitting: PHYSICIAN ASSISTANT
Payer: COMMERCIAL

## 2019-08-07 DIAGNOSIS — K91.2 POSTSURGICAL MALABSORPTION: ICD-10-CM

## 2019-08-07 DIAGNOSIS — Z98.84 BARIATRIC SURGERY STATUS: ICD-10-CM

## 2019-08-07 LAB
ERYTHROCYTE [DISTWIDTH] IN BLOOD BY AUTOMATED COUNT: 12.6 % (ref 10–15)
FERRITIN SERPL-MCNC: 215 NG/ML (ref 26–388)
HCT VFR BLD AUTO: 43.7 % (ref 40–53)
HGB BLD-MCNC: 15.2 G/DL (ref 13.3–17.7)
IRON SATN MFR SERPL: 30 % (ref 15–46)
IRON SERPL-MCNC: 101 UG/DL (ref 35–180)
MCH RBC QN AUTO: 33.4 PG (ref 26.5–33)
MCHC RBC AUTO-ENTMCNC: 34.8 G/DL (ref 31.5–36.5)
MCV RBC AUTO: 96 FL (ref 78–100)
PLATELET # BLD AUTO: 213 10E9/L (ref 150–450)
PTH-INTACT SERPL-MCNC: 33 PG/ML (ref 12–64)
RBC # BLD AUTO: 4.55 10E12/L (ref 4.4–5.9)
TIBC SERPL-MCNC: 333 UG/DL (ref 240–430)
VIT B12 SERPL-MCNC: 664 PG/ML (ref 193–986)
WBC # BLD AUTO: 6.6 10E9/L (ref 4–11)

## 2019-08-07 PROCEDURE — 82607 VITAMIN B-12: CPT | Performed by: PHYSICIAN ASSISTANT

## 2019-08-07 PROCEDURE — 82728 ASSAY OF FERRITIN: CPT | Performed by: PHYSICIAN ASSISTANT

## 2019-08-07 PROCEDURE — 82306 VITAMIN D 25 HYDROXY: CPT | Performed by: PHYSICIAN ASSISTANT

## 2019-08-07 PROCEDURE — 83550 IRON BINDING TEST: CPT | Performed by: PHYSICIAN ASSISTANT

## 2019-08-07 PROCEDURE — 36415 COLL VENOUS BLD VENIPUNCTURE: CPT | Performed by: PHYSICIAN ASSISTANT

## 2019-08-07 PROCEDURE — 83540 ASSAY OF IRON: CPT | Performed by: PHYSICIAN ASSISTANT

## 2019-08-07 PROCEDURE — 85027 COMPLETE CBC AUTOMATED: CPT | Performed by: PHYSICIAN ASSISTANT

## 2019-08-07 PROCEDURE — 83970 ASSAY OF PARATHORMONE: CPT | Performed by: PHYSICIAN ASSISTANT

## 2019-08-08 LAB — DEPRECATED CALCIDIOL+CALCIFEROL SERPL-MC: 34 UG/L (ref 20–75)

## 2019-08-29 ENCOUNTER — OFFICE VISIT (OUTPATIENT)
Dept: SURGERY | Facility: CLINIC | Age: 44
End: 2019-08-29
Payer: COMMERCIAL

## 2019-08-29 VITALS
OXYGEN SATURATION: 98 % | BODY MASS INDEX: 34.79 KG/M2 | SYSTOLIC BLOOD PRESSURE: 116 MMHG | WEIGHT: 216.5 LBS | HEIGHT: 66 IN | HEART RATE: 76 BPM | DIASTOLIC BLOOD PRESSURE: 76 MMHG

## 2019-08-29 DIAGNOSIS — Z98.84 WEIGHT GAIN FOLLOWING GASTRIC BYPASS SURGERY: ICD-10-CM

## 2019-08-29 DIAGNOSIS — R63.5 WEIGHT GAIN FOLLOWING GASTRIC BYPASS SURGERY: ICD-10-CM

## 2019-08-29 DIAGNOSIS — E66.812 CLASS 2 OBESITY DUE TO EXCESS CALORIES WITHOUT SERIOUS COMORBIDITY WITH BODY MASS INDEX (BMI) OF 35.0 TO 35.9 IN ADULT: ICD-10-CM

## 2019-08-29 DIAGNOSIS — K91.2 MALNUTRITION FOLLOWING GASTROINTESTINAL SURGERY: ICD-10-CM

## 2019-08-29 DIAGNOSIS — Z98.84 BARIATRIC SURGERY STATUS: Primary | ICD-10-CM

## 2019-08-29 DIAGNOSIS — E66.09 CLASS 2 OBESITY DUE TO EXCESS CALORIES WITHOUT SERIOUS COMORBIDITY WITH BODY MASS INDEX (BMI) OF 35.0 TO 35.9 IN ADULT: ICD-10-CM

## 2019-08-29 DIAGNOSIS — K21.9 GERD WITHOUT ESOPHAGITIS: ICD-10-CM

## 2019-08-29 PROBLEM — R79.0 ABNORMAL IRON SATURATION: Status: RESOLVED | Noted: 2018-04-12 | Resolved: 2019-08-29

## 2019-08-29 PROBLEM — E66.811 OBESITY (BMI 30.0-34.9): Status: RESOLVED | Noted: 2017-06-30 | Resolved: 2019-08-29

## 2019-08-29 PROCEDURE — 99214 OFFICE O/P EST MOD 30 MIN: CPT | Performed by: PHYSICIAN ASSISTANT

## 2019-08-29 ASSESSMENT — MIFFLIN-ST. JEOR: SCORE: 1796.85

## 2019-08-29 NOTE — PATIENT INSTRUCTIONS
Bariatric Postoperative Vitamins     2 Complete multivitamins with minerals* (at different times than calcium)   2 tablets at bedtime   Vitamin D   5000 Int. Units Daily     Calcium+D  4829-0116 mg in divided doses (Do not take at same time as multivitamin or iron)  500 mg three times daily or 600 mg twice daily.    Vitamin B12   500 mcg sl Tablet daily    *Multivitamin should contain at least     18 mg of Iron, 400 mg of Folic Acid,  2 mg of Copper,  1.5 mg of Thiamine.

## 2019-08-29 NOTE — PROGRESS NOTES
Return Bariatric Surgery Note    RE: Suhail Noriega  MR#: [id number]  : 1975  VISIT DATE: Aug 29, 2019    Dear Ko Pino,    I had the pleasure of seeing your patient, Suhail Noriega, in my post-bariatric surgery assessment clinic.  He returns today for his annual appt. He is 3 years post surgery.      Has gained 8 lbs this past year.  Mainly due to a decrease in his activity level. Started a new job in February.  Is at his desk all day. States he did Bodpod and was found to have 25% body fat.  Feel good knowing this since BMI would suggest otherwise.     Declined seeing the dietician today. He is wondering if there are advancements in appetite suppressants and if this is something he should try. States he is able to eat more than a cup of food.      CHIEF COMPLAINT: Post-bariatric surgery follow-up    HISTORY OF PRESENT ILLNESS:  Questions Regarding Prior Weight Loss Surgery Reviewed With Patient 2019   I had the following weight loss procedure: Sleeve Gastrectomy   What year was your surgery? 2016   How has your weight changed since your last visit? I have stayed about the same   Are you currently taking any weight loss medications? No   Do you currently have any of the following: None of the above   Have you been to the Emergency room since your last visit with us? No   Were you in the hospital since your last visit with us? No   Do you have any concerns today? No       Weight History:     2019   What is your highest lifetime weight? 350   What is your lowest weight since surgery? (In pounds) 198     BARIATRIC METRICS:  Current Weight: 216 lb 8 oz (98.2 kg)  Body mass index is 35.48 kg/m .   Wt change since last visit (lbs): 8.7  Cumulative weight loss (lbs): 123.5    Patient is taking the following bariatric postoperative vitamins:  2 Complete multivitamins with minerals (at different times than calcium)  5000 Int Units of Vitamin D daily   No calcium   500 mcg of Vitamin B12 sl  daily  B complex daily      Questions Regarding Co-Morbidities and Health Concerns Reviewed With Patient 8/29/2019   Pre-diabetes: Never   Diabetes II: Never   High Blood Pressure: Never   High cholesterol: Improved   Heartburn/Reflux: Stayed the same   Sleep apnea: Gone away   PCOS: Never   Back pain: Improved   Joint pain: Improved   Lower leg swelling: Gone away       Eating Habits 8/29/2019   How many meals do you eat per day? 3   Do you snack between meals? Sometimes   How much food are you eating at each meal? Greater than 1 cup   Are you able to separate your meals and liquids by at least 30 minutes? Yes   Are you able to avoid liquid calories? Yes       Exercise Questions Reviewed With Patient 8/29/2019   How often do you exercise? 1 to 2 times per week   What is the duration of your exercise (in minutes)? 45 Minutes   What types of exercise do you do? Walking, running at the onsite gym.  Does not like to weightlift there because they do not have free weights- only cables.  Has avoided gym for this reason.  Does climb stairs at work but not all the time.    What keeps you from being more active?  Lack of Time, Too tired     Last year pt was exercising 5-6 days a week. He was actively trying to build muscle lifting 3x a week and doing cardio another 3 days a week.      Social History:      8/29/2019   Are you smoking? No   Are you drinking alcohol? No   Started a new job in February.  Is at his desk all day.  Can work out at work but they don;t have free weights.  He just cant get to the gym outside of work.      Medications:  Current Outpatient Medications   Medication     multivitamin, therapeutic with minerals (THERA-VIT-M) TABS     ranitidine (ZANTAC) 150 MG tablet     VITAMIN D, CHOLECALCIFEROL, PO     calcium carbonate-vitamin D 600-400 MG-UNIT CHEW     Cyanocobalamin (B-12) 3000 MCG SUBL     doxycycline Monohydrate 100 MG TABS     order for DME     No current facility-administered medications for this  "visit.          8/29/2019   Do you avoid NSAIDs such as (Ibuprofen, Aleve, Naproxen, Advil)?   Yes       ROS:  GI:      8/29/2019   Vomiting: No   Diarrhea: No   Constipation: No   Swallowing trouble: No   Abdominal pain: No   Heartburn: Yes, takes zantac daily at bedtime.  Well controlled with this and avoiding acidic foods.   Rash in skin folds: No   Depression: No   Stress urinary incontinence No     Skin:   BAR RBS ROS - SKIN 8/29/2019   Rash in skin folds: No     Psych:      8/29/2019   Depression: No   Anxiety: No       LABS/IMAGING/MEDICAL RECORDS REVIEW:   Component      Latest Ref Rng & Units 4/27/2017 4/3/2018 8/7/2019   WBC      4.0 - 11.0 10e9/L 9.2 4.7 6.6   RBC Count      4.4 - 5.9 10e12/L 4.56 4.42 4.55   Hemoglobin      13.3 - 17.7 g/dL 15.5 14.6 15.2   Hematocrit      40.0 - 53.0 % 45.7 44.3 43.7   MCV      78 - 100 fl 100 100 96   MCH      26.5 - 33.0 pg 34.0 (H) 33.0 33.4 (H)   MCHC      31.5 - 36.5 g/dL 33.9 33.0 34.8   RDW      10.0 - 15.0 % 13.2 13.0 12.6   Platelet Count      150 - 450 10e9/L 220 222 213   Iron      35 - 180 ug/dL 127 173 101   Iron Binding Cap      240 - 430 ug/dL 319 315 333   Iron Saturation Index      15 - 46 % 40 55 (H) 30   Vitamin D Deficiency screening      20 - 75 ug/L 30 34 34   Parathyroid Hormone Intact      12 - 64 pg/mL 50 37 33   Ferritin      26 - 388 ng/mL 303 341 215   Vitamin B12      193 - 986 pg/mL [phone]       PHYSICAL EXAMINATION:  /76 (BP Location: Right arm, Patient Position: Sitting, Cuff Size: Adult Large)   Pulse 76   Ht 5' 5.5\" (1.664 m)   Wt 216 lb 8 oz (98.2 kg)   SpO2 98%   BMI 35.48 kg/m         ASSESSMENT AND PLAN:      3 years status laparoscopic gastric sleeve     Class II Obesity current BMI: Body mass index is 35.48 kg/m .     Post surgical malabsorption:  Reviewed Labs.  (Discussed recommended bariatric vitamins.  Pt only takes vitamins based on lab and dexa scan results)  Therefore with labs in mind pt will do the " following:  Start Vitamin D 5000 international unit(s) daily.  Go back to [facility] completes multivitamin.  Start 500 mcg vitamin B12 daily.  (level is starting to decrease yearly)      GERD without esophagitis  Continue  - ranitidine (ZANTAC) 150 MG tablet; Take 1 tablet (150 mg) by mouth At Bedtime And BID prn  Dispense: 180 tablet; Refill: 3     Weight Gain  Only use stairs at work.    Get back into the gym routine at work.  As you said, you can get over using the cables since this is when you have the time and energy.      We discussed medication.  Do not feel anti-obesity medication is warranted at this time.  Would consider after patient follows up with dietician for diet for 2 months and returns to regular exercise.      Return to clinic in 1 year.    Sincerely,    Aydee Fitzgerald PA-C    I spent a total of 25 minutes face to face with Suhail during today's office visit. Over 50% of this time was spent counseling the patient and/or coordinating care.

## 2019-09-03 ENCOUNTER — TELEPHONE (OUTPATIENT)
Dept: SURGERY | Facility: CLINIC | Age: 44
End: 2019-09-03

## 2019-09-03 NOTE — TELEPHONE ENCOUNTER
Prior Authorization Retail Medication Request    Medication/Dose: Ranitidine 150mg  ICD code (if different than what is on RX):    Previously Tried and Failed:  Ranitidine  Rationale:  Post bariatric    Insurance Name:  [employer]  Insurance ID:  [id number]       Pharmacy Information (if different than what is on RX)  Name: [facility]    Phone:  [phone]

## 2019-09-10 NOTE — TELEPHONE ENCOUNTER
Central Prior Authorization Team   Phone: [phone]    PA Initiation    Medication: Ranitidine 150mg  Insurance Company: [employer] ([facility]) - Phone [phone] Fax [phone]  Pharmacy Filling the Rx: [id number] - [place] - [address] AT [facility] [address]  Filling Pharmacy Phone: [phone]  Filling Pharmacy Fax: [phone]  Start Date: 9/10/2019

## 2019-09-10 NOTE — TELEPHONE ENCOUNTER
PRIOR AUTHORIZATION DENIED    Medication: Ranitidine 150mg-DENIED    Denial Date: 9/10/2019    Denial Rational: MEDICATION IS EXCLUDED FROM COVERAGE - ALTERNATIVES ARE NOT GIVEN ON EXCLUDED MEDICATIONS.        Appeal Information: N/A

## 2019-10-18 ENCOUNTER — TELEPHONE (OUTPATIENT)
Dept: SURGERY | Facility: CLINIC | Age: 44
End: 2019-10-18

## 2019-10-18 NOTE — TELEPHONE ENCOUNTER
Received voicemail from pt asking if it is ok to take advil for his back since he is several years from his gastric sleeve surgery.     Called pt back at his request.  Discussed that advil WILL give him an ulcer even if taken for a short time.  States clinic recommendation is not to take it at all.  Pt states this is a 30-year old disc injury, nothing recent.  RN suggested to continue tylenol and see primary if he needs more relief.  Pt verbalized agreement.    Catarina Perez RN on 10/18/2019 at 2:16 PM

## 2020-03-10 ENCOUNTER — HEALTH MAINTENANCE LETTER (OUTPATIENT)
Age: 45
End: 2020-03-10

## 2020-12-27 ENCOUNTER — HEALTH MAINTENANCE LETTER (OUTPATIENT)
Age: 45
End: 2020-12-27

## 2021-04-24 ENCOUNTER — HEALTH MAINTENANCE LETTER (OUTPATIENT)
Age: 46
End: 2021-04-24

## 2021-10-04 ENCOUNTER — HEALTH MAINTENANCE LETTER (OUTPATIENT)
Age: 46
End: 2021-10-04

## 2022-05-15 ENCOUNTER — HEALTH MAINTENANCE LETTER (OUTPATIENT)
Age: 47
End: 2022-05-15

## 2022-09-11 ENCOUNTER — HEALTH MAINTENANCE LETTER (OUTPATIENT)
Age: 47
End: 2022-09-11

## 2023-06-03 ENCOUNTER — HEALTH MAINTENANCE LETTER (OUTPATIENT)
Age: 48
End: 2023-06-03